# Patient Record
Sex: FEMALE | Race: AMERICAN INDIAN OR ALASKA NATIVE
[De-identification: names, ages, dates, MRNs, and addresses within clinical notes are randomized per-mention and may not be internally consistent; named-entity substitution may affect disease eponyms.]

---

## 2017-12-26 ENCOUNTER — HOSPITAL ENCOUNTER (EMERGENCY)
Dept: HOSPITAL 5 - ED | Age: 37
Discharge: HOME | End: 2017-12-26
Payer: COMMERCIAL

## 2017-12-26 VITALS — DIASTOLIC BLOOD PRESSURE: 56 MMHG | SYSTOLIC BLOOD PRESSURE: 97 MMHG

## 2017-12-26 DIAGNOSIS — N20.0: Primary | ICD-10-CM

## 2017-12-26 DIAGNOSIS — F17.200: ICD-10-CM

## 2017-12-26 DIAGNOSIS — N39.0: ICD-10-CM

## 2017-12-26 DIAGNOSIS — J45.909: ICD-10-CM

## 2017-12-26 LAB
BILIRUB UR QL STRIP: (no result)
BLOOD UR QL VISUAL: (no result)
KETONES UR STRIP-MCNC: (no result) MG/DL
LEUKOCYTE ESTERASE UR QL STRIP: (no result)
MUCOUS THREADS #/AREA URNS HPF: (no result) /HPF
NITRITE UR QL STRIP: (no result)
PH UR STRIP: 5 [PH] (ref 5–7)
PROT UR STRIP-MCNC: (no result) MG/DL
RBC #/AREA URNS HPF: 4 /HPF (ref 0–6)
UROBILINOGEN UR-MCNC: < 2 MG/DL (ref ?–2)
WBC #/AREA URNS HPF: 13 /HPF (ref 0–6)

## 2017-12-26 PROCEDURE — 81025 URINE PREGNANCY TEST: CPT

## 2017-12-26 PROCEDURE — 96374 THER/PROPH/DIAG INJ IV PUSH: CPT

## 2017-12-26 PROCEDURE — 81001 URINALYSIS AUTO W/SCOPE: CPT

## 2017-12-26 PROCEDURE — 99284 EMERGENCY DEPT VISIT MOD MDM: CPT

## 2017-12-26 PROCEDURE — 74176 CT ABD & PELVIS W/O CONTRAST: CPT

## 2017-12-26 PROCEDURE — 96361 HYDRATE IV INFUSION ADD-ON: CPT

## 2017-12-26 PROCEDURE — 72100 X-RAY EXAM L-S SPINE 2/3 VWS: CPT

## 2017-12-26 NOTE — EMERGENCY DEPARTMENT REPORT
ED Back Pain/Injury HPI





- General


Chief Complaint: Back Pain/Injury


Stated Complaint: BACK PAIN/BLURRY VISION


Time Seen by Provider: 12/26/17 18:08


Source: patient


Limitations: No Limitations





- History of Present Illness


Initial Comments: 





This is a 37-year-old female nontoxic, well nourished in appearance, no acute 

signs of distress presents to the ED with c/o of low/flank back pain x1 day. 

Patient denies any trauma to the region. Patient denies any numbness, tingling, 

radiation of pain, fever, chills, headache, nausea, vomiting, chest pain, 

shortness of breathe, stiff neck. Patient denies any bladder or bowel 

instability. Patient denies any dysuria, polyuria, hematuria. Patient denies 

any allergies or PMH besides asthma. Patient denies history of kidney stone or 

back pains.  Patient stated she wake up with these pains. 


MD Complaint: back pain


-: days(s)


Similar Symptoms Previously: No


Place: home


Radiation: none


Severity: mild


Severity scale (0 -10): 8


Quality: aching


Consistency: constant


Improves With: none


Worsens With: none


Associated Symptoms: denies other symptoms.  denies: confusion, weakness, chest 

pain, numbness, difficulty walking, cough, difficulty urinating, diaphoresis, 

incontinence, fever/chills, constipation, headaches, abdominal pain, loss of 

appetite, malaise, nausea/vomiting, rash, seizure, shortness of breath, syncope





- Related Data


 Previous Rx's











 Medication  Instructions  Recorded  Last Taken  Type


 


Acetaminophen/Codeine [Tylenol #3] 1 tab PO Q6H PRN #15 tab 11/01/15 Unknown Rx


 


Carbamide Peroxide 6.5% [Ear Wax 4 drops AU QHS #1 bottle 11/01/15 Unknown Rx





Drops]    


 


methOCARBAMOL [Robaxin TAB] 500 mg PO BID #20 tab 11/01/15 Unknown Rx


 


Sulfamethoxazole/Trimethoprim 1 each PO BID #14 tablet 12/26/17 Unknown Rx





[Bactrim DS TAB]    


 


traMADol [Ultram] 50 mg PO Q6HR PRN #12 tablet 12/26/17 Unknown Rx











 Allergies











Allergy/AdvReac Type Severity Reaction Status Date / Time


 


No Known Allergies Allergy   Verified 12/26/17 14:04














ED Review of Systems


ROS: 


Stated complaint: BACK PAIN/BLURRY VISION


Other details as noted in HPI





Constitutional: denies: chills, fever


Eyes: denies: eye pain, eye discharge, vision change


ENT: denies: ear pain, throat pain


Respiratory: denies: cough, shortness of breath, wheezing


Cardiovascular: denies: chest pain, palpitations


Endocrine: no symptoms reported


Gastrointestinal: denies: abdominal pain, nausea, diarrhea


Genitourinary: denies: urgency, dysuria, discharge


Musculoskeletal: back pain.  denies: joint swelling, arthralgia


Skin: denies: rash, lesions


Neurological: denies: headache, weakness, paresthesias


Psychiatric: denies: anxiety, depression


Hematological/Lymphatic: denies: easy bleeding, easy bruising





ED Past Medical Hx





- Past Medical History


Hx Asthma: Yes





- Surgical History


Hx Cholecystectomy: Yes


Additional Surgical History: c section





- Social History


Smoking Status: Current Every Day Smoker


Substance Use Type: None





- Medications


Home Medications: 


 Home Medications











 Medication  Instructions  Recorded  Confirmed  Last Taken  Type


 


Acetaminophen/Codeine [Tylenol #3] 1 tab PO Q6H PRN #15 tab 11/01/15  Unknown Rx


 


Carbamide Peroxide 6.5% [Ear Wax 4 drops AU QHS #1 bottle 11/01/15  Unknown Rx





Drops]     


 


methOCARBAMOL [Robaxin TAB] 500 mg PO BID #20 tab 11/01/15  Unknown Rx


 


Sulfamethoxazole/Trimethoprim 1 each PO BID #14 tablet 12/26/17  Unknown Rx





[Bactrim DS TAB]     


 


traMADol [Ultram] 50 mg PO Q6HR PRN #12 tablet 12/26/17  Unknown Rx














ED Physical Exam





- General


Limitations: No Limitations


General appearance: alert, in no apparent distress





- Head


Head exam: Present: atraumatic, normocephalic, normal inspection





- Eye


Eye exam: Present: normal appearance, PERRL, EOMI.  Absent: scleral icterus, 

conjunctival injection, nystagmus, periorbital swelling, periorbital tenderness


Pupils: Present: normal accommodation





- ENT


ENT exam: Present: normal exam, normal orophraynx, mucous membranes moist, TM's 

normal bilaterally, normal external ear exam





- Neck


Neck exam: Present: normal inspection, full ROM.  Absent: tenderness, 

meningismus, lymphadenopathy, thyromegaly





- Respiratory


Respiratory exam: Present: normal lung sounds bilaterally.  Absent: respiratory 

distress, wheezes, rales, rhonchi, stridor, chest wall tenderness, accessory 

muscle use, decreased breath sounds, prolonged expiratory





- Cardiovascular


Cardiovascular Exam: Present: regular rate, normal rhythm, normal heart sounds.

  Absent: irregular rhythm, systolic murmur, diastolic murmur, rubs, gallop





- GI/Abdominal


GI/Abdominal exam: Present: soft, normal bowel sounds.  Absent: distended, 

tenderness, guarding, rebound, rigid, diminished bowel sounds





- Rectal


Rectal exam: Present: deferred





- Extremities Exam


Extremities exam: Present: normal inspection, full ROM, normal capillary 

refill.  Absent: tenderness, pedal edema, joint swelling, calf tenderness





- Back Exam


Back exam: Present: normal inspection, full ROM, paraspinal tenderness (lumbar 

region).  Absent: tenderness, CVA tenderness (R), CVA tenderness (L), muscle 

spasm, vertebral tenderness, rash noted





- Expanded Back Exam


  ** Expanded


Back exam: Present: normal rectal tone (as per patient).  Absent: saddle 

anesthesia


Back exam: Negative Straight Leg Raising: Left, Right





- Neurological Exam


Neurological exam: Present: alert, oriented X3, CN II-XII intact, normal gait, 

reflexes normal





- Psychiatric


Psychiatric exam: Present: normal affect, normal mood





- Skin


Skin exam: Present: warm, dry, intact, normal color.  Absent: rash





- Other


Other exam information: 





No bladder or bowel instability.  No joint swelling or redness. No deformity.  

No numbness, no tingling.  No ecchymosis.  No abdominal distention.





ED Course


 Vital Signs











  12/26/17 12/26/17





  14:04 20:54


 


Temperature 99.3 F 98.1 F


 


Pulse Rate 92 H 68


 


Respiratory 18 18





Rate  


 


Blood Pressure 103/32 


 


Blood Pressure  97/56





[Left]  


 


O2 Sat by Pulse 100 99





Oximetry  














- Reevaluation(s)


Reevaluation #1: 





12/26/17 19:23


Patient is speaking in full sentences with no signs of distress noted.





ED Medical Decision Making





- Medical Decision Making





This is a 37-year-old female that presents with bilateral renal nonobstructing 

calculi and UTI. Patient is stable and was examined by me. UA obtained with 

elevated leukocytes, WBCs and RBCs.  CT without contrast of abdomen/pelvis has 

been obtained to rule out kidney stones and the radiologist.  X-ray of lumbar 

spine obtained and also dictated by radiologist and normal exam. Patient was 

notified of results with no questions noted. Patient received 1L of normal 

saline and Toradol in the ED which patient stated symptoms are improving and 

subsiding. Patient is discharged with Ultram and Bactrim for UTI. Patient was 

instructed to Follow-up with a primary care doctor in 3-5 days or if symptoms 

worsen and continue return to emergency room as soon as possible.  At time time 

of discharge, the patient does not seem toxic or ill in appearance.  No acute 

signs of distress noted.  Patient agrees to discharge treatment plan of care.  

No further questions noted by the patient.


Critical care attestation.: 


If time is entered above; I have spent that time in minutes in the direct care 

of this critically ill patient, excluding procedure time.








ED Disposition


Clinical Impression: 


 Renal calculi





UTI (urinary tract infection)


Qualifiers:


 Urinary tract infection type: site unspecified Hematuria presence: with 

hematuria Qualified Code(s): N39.0 - Urinary tract infection, site not specified





Disposition: DC-01 TO HOME OR SELFCARE


Is pt being admited?: No


Does the pt Need Aspirin: No


Condition: Stable


Instructions:  Sulfamethoxazole/Trimethoprim (By mouth), Tramadol (By mouth), 

Kidney Stones (ED)


Additional Instructions: 


Follow-up with a primary care doctor in 3-5 days or if symptoms worsen and 

continue return to emergency room as soon as possible. 


Increase hydration as much as possible.


Prescriptions: 


Sulfamethoxazole/Trimethoprim [Bactrim DS TAB] 1 each PO BID #14 tablet


traMADol [Ultram] 50 mg PO Q6HR PRN #12 tablet


 PRN Reason: Pain


Referrals: 


PRIMARY CARE,MD [Primary Care Provider] - 3-5 Days


TIMMY ESCOBAR MD [Staff Physician] - 3-5 Days


[facility] [Outside] - 3-5 Days


[facility] [Outside] - 3-5 Days


Forms:  Work/School Release Form(ED)

## 2017-12-26 NOTE — XRAY REPORT
FINAL REPORT



EXAM:  XR SPINE LUMBOSACRAL 2-3V



HISTORY:  back pain 



TECHNIQUE:  Three views lumbosacral spine



Comparison: CT same day with reconstructed images



FINDINGS:  

Normal lumbar lordosis.



Vertebral body heights and disc space heights are maintained.



There is no spondylolisthesis.



SI joints are.



Previous bilateral tubal interruption clips.



There are clips in the right upper quadrant.



CT earlier same day demonstrated bilateral renal non obstructive

calculi.



IMPRESSION:  

Normal lumbosacral spine series.

## 2017-12-26 NOTE — CAT SCAN REPORT
FINAL REPORT



PROCEDURE:  CT abdomen and pelvis without contrast. 



TECHNIQUE:  Computerized axial tomography of the abdomen and

pelvis was performed without intravenous contrast. This study is

performed without intravascular contrast material and its

sensitivity for abdominal and pelvic pathology, including

neoplasms, inflammation, abscess, free fluid, thrombosis,

arterial dissection and infarction, is reduced compared with a

contrast enhanced study. 



HISTORY:  Back pain, rule out kidney stone. 



COMPARISON:  No prior studies are available for comparison.



FINDINGS:  

The lung bases are clear. There are no pleural effusions. The

heart size is normal. The liver, pancreas and spleen are grossly

normal. Cholecystectomy clips are present. There is no biliary

dilatation. The adrenal glands are not enlarged. Both kidneys

appear normal in size and configuration. There are approximately

5 small nonobstructing right renal calculi. The largest is in the

middle 3rd measuring 3.3 millimeters. There are approximately 5

small nonobstructing calculi in the left kidney. The largest is

in the middle 3rd measuring 3.7 millimeters. There is no

hydronephrosis. There are no definite ureteral calculi. The

abdominal aorta has a normal caliber. There is no retroperitoneal

adenopathy. The unopacified gastrointestinal tract is

unremarkable. A normal appendix is visible. The bladder, uterus

and adnexal regions are unremarkable. There are bilateral

fallopian tube clips. The regional skeleton appears intact. 



IMPRESSION:  

Small bilateral nonobstructing renal calculi. Previous

cholecystectomy and previous fallopian tube ligation.

## 2018-03-08 ENCOUNTER — HOSPITAL ENCOUNTER (EMERGENCY)
Dept: HOSPITAL 5 - ED | Age: 38
Discharge: HOME | End: 2018-03-08
Payer: COMMERCIAL

## 2018-03-08 VITALS — SYSTOLIC BLOOD PRESSURE: 117 MMHG | DIASTOLIC BLOOD PRESSURE: 74 MMHG
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
## 2018-03-08 DIAGNOSIS — Z90.49: ICD-10-CM

## 2018-03-08 DIAGNOSIS — F17.200: ICD-10-CM
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
## 2018-03-08 DIAGNOSIS — M54.5: Primary | ICD-10-CM
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
## 2018-03-08 DIAGNOSIS — J45.909: ICD-10-CM

## 2018-03-08 PROCEDURE — 72100 X-RAY EXAM L-S SPINE 2/3 VWS: CPT

## 2018-03-08 PROCEDURE — 99283 EMERGENCY DEPT VISIT LOW MDM: CPT
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
## 2018-03-08 NOTE — XRAY REPORT
LUMBOSACRAL SPINE, 3 VIEWS:



History: Back pain



Findings: The vertebral bodies, disk spaces and posterior elements are 

intact.  No compression deformity or malalignment.  The SI joints are 

symmetric and unremarkable.



Impression:

1.  No evidence for acute injury to the lumbar spine.
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
## 2018-04-13 ENCOUNTER — HOSPITAL ENCOUNTER (EMERGENCY)
Dept: HOSPITAL 5 - ED | Age: 38
Discharge: HOME | End: 2018-04-13
Payer: COMMERCIAL

## 2018-04-13 VITALS — DIASTOLIC BLOOD PRESSURE: 76 MMHG | SYSTOLIC BLOOD PRESSURE: 133 MMHG

## 2018-04-13 DIAGNOSIS — Z90.49: ICD-10-CM

## 2018-04-13 DIAGNOSIS — F17.200: ICD-10-CM

## 2018-04-13 DIAGNOSIS — L03.011: Primary | ICD-10-CM

## 2018-04-13 DIAGNOSIS — J45.909: ICD-10-CM

## 2018-04-13 NOTE — EMERGENCY DEPARTMENT REPORT
- General


Chief complaint: Extremity Injury, Upper


Stated complaint: FINGER PAIN


Time Seen by Provider: 04/13/18 09:58


Source: patient


Mode of arrival: Ambulatory


Limitations: No Limitations





- History of Present Illness


Initial comments: 





37-year-old American female comes in reporting pain in posterior right fifth 

digit.  She reports this been going on for 3 days.  She denies any past medical 

history currently takes no medications on a daily basis and has no known drug 

allergies.  Patient denies any recent trauma no fever no chills no nausea no 

vomiting.  Patient reports she is able to move all her extremities without 

difficulties.  She feels that this happened because she cut her finger nail and 

nicked the side of her finger.


MD complaint: abscess/boil


Severity scale (0 -10): 9


Quality: aching


Consistency: constant


Improves with: none


Worsens with: none


Associated symptoms: denies other symptoms


Treatments Prior to Arrival: none





- Related Data


 Previous Rx's











 Medication  Instructions  Recorded  Last Taken  Type


 


Acetaminophen/Codeine [Tylenol #3] 1 tab PO Q6H PRN #15 tab 11/01/15 Unknown Rx


 


Carbamide Peroxide 6.5% [Ear Wax 4 drops AU QHS #1 bottle 11/01/15 Unknown Rx





Drops]    


 


methOCARBAMOL [Robaxin TAB] 500 mg PO BID #20 tab 11/01/15 Unknown Rx


 


traMADol [Ultram] 50 mg PO Q6HR PRN #12 tablet 12/26/17 Unknown Rx


 


Cyclobenzaprine [Flexeril] 10 mg PO TID PRN #12 tablet 03/08/18 Unknown Rx


 


Ibuprofen [Motrin 800 MG tab] 800 mg PO Q8HR PRN #30 tablet 04/13/18 Unknown Rx


 


Sulfamethoxazole/Trimethoprim 1 each PO BID #14 tablet 04/13/18 Unknown Rx





[Bactrim DS TAB]    











 Allergies











Allergy/AdvReac Type Severity Reaction Status Date / Time


 


No Known Allergies Allergy   Verified 12/26/17 14:04














Abscess Boil HPI





- HPI


Chief Complaint: Extremity Injury, Upper


Stated Complaint: FINGER PAIN


Time Seen by Provider: 04/13/18 09:58


Home Medications: 


 Previous Rx's











 Medication  Instructions  Recorded  Last Taken  Type


 


Acetaminophen/Codeine [Tylenol #3] 1 tab PO Q6H PRN #15 tab 11/01/15 Unknown Rx


 


Carbamide Peroxide 6.5% [Ear Wax 4 drops AU QHS #1 bottle 11/01/15 Unknown Rx





Drops]    


 


methOCARBAMOL [Robaxin TAB] 500 mg PO BID #20 tab 11/01/15 Unknown Rx


 


traMADol [Ultram] 50 mg PO Q6HR PRN #12 tablet 12/26/17 Unknown Rx


 


Cyclobenzaprine [Flexeril] 10 mg PO TID PRN #12 tablet 03/08/18 Unknown Rx


 


Ibuprofen [Motrin 800 MG tab] 800 mg PO Q8HR PRN #30 tablet 04/13/18 Unknown Rx


 


Sulfamethoxazole/Trimethoprim 1 each PO BID #14 tablet 04/13/18 Unknown Rx





[Bactrim DS TAB]    











Allergies/Adverse Reactions: 


 Allergies











Allergy/AdvReac Type Severity Reaction Status Date / Time


 


No Known Allergies Allergy   Verified 12/26/17 14:04














ED Review of Systems


ROS: 


Stated complaint: FINGER PAIN


Other details as noted in HPI





Constitutional: denies: chills, fever


Eyes: denies: eye pain, eye discharge, vision change


ENT: denies: ear pain, throat pain


Respiratory: denies: cough, shortness of breath, wheezing


Cardiovascular: denies: chest pain, palpitations


Endocrine: no symptoms reported


Gastrointestinal: denies: abdominal pain, nausea, diarrhea


Genitourinary: denies: urgency, dysuria, discharge


Musculoskeletal: denies: back pain, joint swelling, arthralgia


Skin: other (swelling to right fifth digit with redness and pus under the skin)

.  denies: rash, lesions


Neurological: denies: headache, weakness, paresthesias


Psychiatric: denies: anxiety, depression


Hematological/Lymphatic: denies: easy bleeding, easy bruising





ED Past Medical Hx





- Past Medical History


Hx Asthma: Yes





- Surgical History


Hx Cholecystectomy: Yes


Additional Surgical History: c section





- Social History


Smoking Status: Current Every Day Smoker


Substance Use Type: None





- Medications


Home Medications: 


 Home Medications











 Medication  Instructions  Recorded  Confirmed  Last Taken  Type


 


Acetaminophen/Codeine [Tylenol #3] 1 tab PO Q6H PRN #15 tab 11/01/15  Unknown Rx


 


Carbamide Peroxide 6.5% [Ear Wax 4 drops AU QHS #1 bottle 11/01/15  Unknown Rx





Drops]     


 


methOCARBAMOL [Robaxin TAB] 500 mg PO BID #20 tab 11/01/15  Unknown Rx


 


traMADol [Ultram] 50 mg PO Q6HR PRN #12 tablet 12/26/17  Unknown Rx


 


Cyclobenzaprine [Flexeril] 10 mg PO TID PRN #12 tablet 03/08/18  Unknown Rx


 


Ibuprofen [Motrin 800 MG tab] 800 mg PO Q8HR PRN #30 tablet 04/13/18  Unknown Rx


 


Sulfamethoxazole/Trimethoprim 1 each PO BID #14 tablet 04/13/18  Unknown Rx





[Bactrim DS TAB]     














ED Physical Exam





- General


Limitations: No Limitations


General appearance: alert, in no apparent distress





- Head


Head exam: Present: atraumatic, normocephalic





- Eye


Eye exam: Present: normal appearance





- ENT


ENT exam: Present: mucous membranes moist





- Neck


Neck exam: Present: normal inspection





- Respiratory


Respiratory exam: Present: normal lung sounds bilaterally.  Absent: respiratory 

distress





- Cardiovascular


Cardiovascular Exam: Present: regular rate, normal rhythm.  Absent: systolic 

murmur, diastolic murmur, rubs, gallop





- GI/Abdominal


GI/Abdominal exam: Present: soft, normal bowel sounds





- Extremities Exam


Extremities exam: Present: normal inspection





- Back Exam


Back exam: Present: normal inspection





- Neurological Exam


Neurological exam: Present: alert, oriented X3





- Psychiatric


Psychiatric exam: Present: normal affect, normal mood





- Skin


Skin exam: Present: warm, dry, intact, normal color, other (right hand fifth 

digit swelling at the tip of the finger with purulent discharge beneath the 

skin with tenderness to touch, full range of motion).  Absent: rash





ED Course





 Vital Signs











  04/13/18





  08:03


 


Temperature 97.8 F


 


Pulse Rate 80


 


Respiratory 18





Rate 


 


Blood Pressure 133/76


 


O2 Sat by Pulse 100





Oximetry 














- I & D


  ** Right Finger


Type of Procedure: Simple


Site: right fifth digit lateral cuticle


Blade Size: 11


I & D Procedure: betadine prep, sterile drapes applied, sterile dressing applied


Critical care attestation.: 


If time is entered above; I have spent that time in minutes in the direct care 

of this critically ill patient, excluding procedure time.








ED Disposition


Clinical Impression: 


 Paronychia of finger of right hand





Disposition: DC-01 TO HOME OR SELFCARE


Is pt being admited?: No


Does the pt Need Aspirin: No


Condition: Stable


Instructions:  Paronychia (ED)


Additional Instructions: 


Complete antibiotics as prescribed.  Pain medication over-the-counter Tylenol 

or Motrin.  Return back to the emergency room if symptoms persist or gets worse


Prescriptions: 


Ibuprofen [Motrin 800 MG tab] 800 mg PO Q8HR PRN #30 tablet


 PRN Reason: Pain


Sulfamethoxazole/Trimethoprim [Bactrim DS TAB] 1 each PO BID #14 tablet


Referrals: 


PRIMARY CARE,MD [Primary Care Provider] - 3-5 Days


Forms:  Work/School Release Form(ED)

## 2019-07-05 ENCOUNTER — HOSPITAL ENCOUNTER (EMERGENCY)
Dept: HOSPITAL 5 - ED | Age: 39
LOS: 1 days | Discharge: HOME | End: 2019-07-06
Payer: MEDICAID

## 2019-07-05 VITALS — DIASTOLIC BLOOD PRESSURE: 74 MMHG | SYSTOLIC BLOOD PRESSURE: 147 MMHG

## 2019-07-05 DIAGNOSIS — J45.909: ICD-10-CM

## 2019-07-05 DIAGNOSIS — M79.605: ICD-10-CM

## 2019-07-05 DIAGNOSIS — Z90.49: ICD-10-CM

## 2019-07-05 DIAGNOSIS — N30.00: Primary | ICD-10-CM

## 2019-07-05 DIAGNOSIS — Z79.899: ICD-10-CM

## 2019-07-05 PROCEDURE — 87086 URINE CULTURE/COLONY COUNT: CPT

## 2019-07-05 PROCEDURE — 81001 URINALYSIS AUTO W/SCOPE: CPT

## 2019-07-05 PROCEDURE — 85025 COMPLETE CBC W/AUTO DIFF WBC: CPT

## 2019-07-05 PROCEDURE — 96372 THER/PROPH/DIAG INJ SC/IM: CPT

## 2019-07-05 PROCEDURE — 85610 PROTHROMBIN TIME: CPT

## 2019-07-05 PROCEDURE — 99283 EMERGENCY DEPT VISIT LOW MDM: CPT

## 2019-07-05 PROCEDURE — 36415 COLL VENOUS BLD VENIPUNCTURE: CPT

## 2019-07-05 PROCEDURE — 81025 URINE PREGNANCY TEST: CPT

## 2019-07-05 PROCEDURE — 85379 FIBRIN DEGRADATION QUANT: CPT

## 2019-07-06 LAB
BASOPHILS # (AUTO): 0 K/MM3 (ref 0–0.1)
BASOPHILS NFR BLD AUTO: 0.3 % (ref 0–1.8)
BILIRUB UR QL STRIP: (no result)
BLOOD UR QL VISUAL: (no result)
EOSINOPHIL # BLD AUTO: 0.2 K/MM3 (ref 0–0.4)
EOSINOPHIL NFR BLD AUTO: 1.6 % (ref 0–4.3)
HCT VFR BLD CALC: 30.6 % (ref 30.3–42.9)
HGB BLD-MCNC: 9.7 GM/DL (ref 10.1–14.3)
INR PPP: 0.99 (ref 0.87–1.13)
LYMPHOCYTES # BLD AUTO: 3 K/MM3 (ref 1.2–5.4)
LYMPHOCYTES NFR BLD AUTO: 28.5 % (ref 13.4–35)
MCHC RBC AUTO-ENTMCNC: 32 % (ref 30–34)
MCV RBC AUTO: 71 FL (ref 79–97)
MONOCYTES # (AUTO): 0.8 K/MM3 (ref 0–0.8)
MONOCYTES % (AUTO): 7.6 % (ref 0–7.3)
MUCOUS THREADS #/AREA URNS HPF: (no result) /HPF
PH UR STRIP: 6 [PH] (ref 5–7)
PLATELET # BLD: 438 K/MM3 (ref 140–440)
RBC # BLD AUTO: 4.32 M/MM3 (ref 3.65–5.03)
RBC #/AREA URNS HPF: > 182 /HPF (ref 0–6)
UROBILINOGEN UR-MCNC: < 2 MG/DL (ref ?–2)
WBC #/AREA URNS HPF: 87 /HPF (ref 0–6)

## 2019-07-06 NOTE — EMERGENCY DEPARTMENT REPORT
ED General Adult HPI





- General


Chief complaint: Abdominal Pain


Stated complaint: ABDOMINAL PAIN/CRAMPING


Time Seen by Provider: 07/06/19 01:03


Source: patient


Mode of arrival: Ambulatory


Limitations: No Limitations





- History of Present Illness


Initial comments: 





Patient is a 39-year-old  female who is presenting with 

suprapubic and lower back crampiness.  This been present for the last 2 days.  

Patient denies any nausea vomiting diarrhea or vaginal bleeding but she does 

have some mild dysuria.  The patient states pain is 6 out of 10 in severity.  

Patient also states that her left lower extremity is hurting over the last 

several days as well.  Has been no swelling or redness present.  Patient has a 

history of using a walker secondary to an injury she sustained to the right 

ankle.  Patient states for the last several days her left lower extremity C in 

the posterior calf has been aching and throbbing.  There's been no trauma that 

she can remember.


Severity scale (0 -10): 7





- Related Data


                                  Previous Rx's











 Medication  Instructions  Recorded  Last Taken  Type


 


Acetaminophen/Codeine [Tylenol #3] 1 tab PO Q6H PRN #15 tab 11/01/15 Unknown Rx


 


Carbamide Peroxide 6.5% [Ear Wax 4 drops AU QHS #1 bottle 11/01/15 Unknown Rx





Drops]    


 


methOCARBAMOL [Robaxin TAB] 500 mg PO BID #20 tab 11/01/15 Unknown Rx


 


traMADol [Ultram] 50 mg PO Q6HR PRN #12 tablet 12/26/17 Unknown Rx


 


Cyclobenzaprine [Flexeril] 10 mg PO TID PRN #12 tablet 03/08/18 Unknown Rx


 


Ibuprofen [Motrin 800 MG tab] 800 mg PO Q8HR PRN #30 tablet 04/13/18 Unknown Rx


 


Sulfamethoxazole/Trimethoprim 1 each PO BID #14 tablet 04/13/18 Unknown Rx





[Bactrim DS TAB]    


 


Ibuprofen [Motrin 600 MG tab] 600 mg PO Q8H PRN #20 tablet 07/06/19 Unknown Rx


 


Nitrofurantoin Mono/M-Cryst 100 mg PO Q12HR #14 capsule 07/06/19 Unknown Rx





[Macrobid CAP]    


 


Phenazopyridine [Pyridium] 200 mg PO BID #6 tab 07/06/19 Unknown Rx


 


traMADol [Ultram] 50 mg PO Q6HR PRN #12 tablet 07/06/19 Unknown Rx











                                    Allergies











Allergy/AdvReac Type Severity Reaction Status Date / Time


 


No Known Allergies Allergy   Verified 12/26/17 14:04














ED Review of Systems


ROS: 


Stated complaint: ABDOMINAL PAIN/CRAMPING


Other details as noted in HPI





Comment: All other systems reviewed and negative





ED Past Medical Hx





- Past Medical History


Hx Asthma: Yes





- Surgical History


Hx Cholecystectomy: Yes


Additional Surgical History: c section





- Social History


Smoking Status: Unknown if ever smoked


Substance Use Type: None





- Medications


Home Medications: 


                                Home Medications











 Medication  Instructions  Recorded  Confirmed  Last Taken  Type


 


Acetaminophen/Codeine [Tylenol #3] 1 tab PO Q6H PRN #15 tab 11/01/15  Unknown Rx


 


Carbamide Peroxide 6.5% [Ear Wax 4 drops AU QHS #1 bottle 11/01/15  Unknown Rx





Drops]     


 


methOCARBAMOL [Robaxin TAB] 500 mg PO BID #20 tab 11/01/15  Unknown Rx


 


traMADol [Ultram] 50 mg PO Q6HR PRN #12 tablet 12/26/17  Unknown Rx


 


Cyclobenzaprine [Flexeril] 10 mg PO TID PRN #12 tablet 03/08/18  Unknown Rx


 


Ibuprofen [Motrin 800 MG tab] 800 mg PO Q8HR PRN #30 tablet 04/13/18  Unknown Rx


 


Sulfamethoxazole/Trimethoprim 1 each PO BID #14 tablet 04/13/18  Unknown Rx





[Bactrim DS TAB]     


 


Ibuprofen [Motrin 600 MG tab] 600 mg PO Q8H PRN #20 tablet 07/06/19  Unknown Rx


 


Nitrofurantoin Mono/M-Cryst 100 mg PO Q12HR #14 capsule 07/06/19  Unknown Rx





[Macrobid CAP]     


 


Phenazopyridine [Pyridium] 200 mg PO BID #6 tab 07/06/19  Unknown Rx


 


traMADol [Ultram] 50 mg PO Q6HR PRN #12 tablet 07/06/19  Unknown Rx














ED Physical Exam





- General


Limitations: No Limitations


General appearance: alert, in no apparent distress





- Head


Head exam: Present: atraumatic, normocephalic





- Eye


Eye exam: Present: normal appearance





- ENT


ENT exam: Present: mucous membranes moist





- Neck


Neck exam: Present: normal inspection





- Respiratory


Respiratory exam: Present: normal lung sounds bilaterally.  Absent: respiratory 

distress, wheezes, rales, rhonchi





- Cardiovascular


Cardiovascular Exam: Present: regular rate, normal rhythm.  Absent: systolic 

murmur, diastolic murmur, rubs, gallop





- GI/Abdominal


GI/Abdominal exam: Present: soft, tenderness (suprapubic), normal bowel sounds. 

 Absent: distended, guarding, rebound, rigid





- Extremities Exam


Extremities exam: Present: normal inspection





- Back Exam


Back exam: Present: normal inspection





- Neurological Exam


Neurological exam: Present: alert, oriented X3





- Psychiatric


Psychiatric exam: Present: normal affect, normal mood





- Skin


Skin exam: Present: warm, dry, intact, normal color.  Absent: rash





ED Course





                                   Vital Signs











  07/05/19 07/05/19 07/06/19





  23:05 23:10 01:17


 


Temperature 98.8 F 98.4 F 


 


Pulse Rate 85 68 


 


Respiratory 16 16 16





Rate   


 


Blood Pressure 147/74  


 


O2 Sat by Pulse 99 100 





Oximetry   














ED Medical Decision Making





- Lab Data


Result diagrams: 


                                 07/05/19 23:25











                                   Lab Results











  07/05/19 07/05/19 07/05/19 Range/Units





  23:25 23:25 23:30 


 


WBC  10.4    (4.5-11.0)  K/mm3


 


RBC  4.32    (3.65-5.03)  M/mm3


 


Hgb  9.7 L    (10.1-14.3)  gm/dl


 


Hct  30.6    (30.3-42.9)  %


 


MCV  71 L    (79-97)  fl


 


MCH  23 L    (28-32)  pg


 


MCHC  32    (30-34)  %


 


RDW  18.2 H    (13.2-15.2)  %


 


Plt Count  438    (140-440)  K/mm3


 


Lymph % (Auto)  28.5    (13.4-35.0)  %


 


Mono % (Auto)  7.6 H    (0.0-7.3)  %


 


Eos % (Auto)  1.6    (0.0-4.3)  %


 


Baso % (Auto)  0.3    (0.0-1.8)  %


 


Lymph #  3.0    (1.2-5.4)  K/mm3


 


Mono #  0.8    (0.0-0.8)  K/mm3


 


Eos #  0.2    (0.0-0.4)  K/mm3


 


Baso #  0.0    (0.0-0.1)  K/mm3


 


Seg Neutrophils %  62.0    (40.0-70.0)  %


 


Seg Neutrophils #  6.4    (1.8-7.7)  K/mm3


 


PT   12.8   (12.2-14.9)  Sec.


 


INR   0.99   (0.87-1.13)  


 


D-Dimer     (0-234)  ng/mlDDU


 


Urine Color    Red  (Yellow)  


 


Urine Turbidity    Slightly-cloudy  (Clear)  


 


Urine pH    6.0  (5.0-7.0)  


 


Ur Specific Gravity    1.020  (1.003-1.030)  


 


Urine Protein    30 mg/dl  (Negative)  mg/dL


 


Urine Glucose (UA)    Neg  (Negative)  mg/dL


 


Urine Ketones    Neg  (Negative)  mg/dL


 


Urine Blood    Lg  (Negative)  


 


Urine Nitrite    Neg  (Negative)  


 


Urine Bilirubin    Neg  (Negative)  


 


Urine Urobilinogen    < 2.0  (<2.0)  mg/dL


 


Ur Leukocyte Esterase    Sm  (Negative)  


 


Urine WBC (Auto)    87.0 H  (0.0-6.0)  /HPF


 


Urine RBC (Auto)    > 182.0  (0.0-6.0)  /HPF


 


U Epithel Cells (Auto)    4.0  (0-13.0)  /HPF


 


Urine Mucus    1+  /HPF


 


Urine Yeast (Budding)    2+  /HPF


 


Urine HCG, Qual    Negative  (Negative)  














  07/06/19 Range/Units





  01:26 


 


WBC   (4.5-11.0)  K/mm3


 


RBC   (3.65-5.03)  M/mm3


 


Hgb   (10.1-14.3)  gm/dl


 


Hct   (30.3-42.9)  %


 


MCV   (79-97)  fl


 


MCH   (28-32)  pg


 


MCHC   (30-34)  %


 


RDW   (13.2-15.2)  %


 


Plt Count   (140-440)  K/mm3


 


Lymph % (Auto)   (13.4-35.0)  %


 


Mono % (Auto)   (0.0-7.3)  %


 


Eos % (Auto)   (0.0-4.3)  %


 


Baso % (Auto)   (0.0-1.8)  %


 


Lymph #   (1.2-5.4)  K/mm3


 


Mono #   (0.0-0.8)  K/mm3


 


Eos #   (0.0-0.4)  K/mm3


 


Baso #   (0.0-0.1)  K/mm3


 


Seg Neutrophils %   (40.0-70.0)  %


 


Seg Neutrophils #   (1.8-7.7)  K/mm3


 


PT   (12.2-14.9)  Sec.


 


INR   (0.87-1.13)  


 


D-Dimer  373.86 H  (0-234)  ng/mlDDU


 


Urine Color   (Yellow)  


 


Urine Turbidity   (Clear)  


 


Urine pH   (5.0-7.0)  


 


Ur Specific Gravity   (1.003-1.030)  


 


Urine Protein   (Negative)  mg/dL


 


Urine Glucose (UA)   (Negative)  mg/dL


 


Urine Ketones   (Negative)  mg/dL


 


Urine Blood   (Negative)  


 


Urine Nitrite   (Negative)  


 


Urine Bilirubin   (Negative)  


 


Urine Urobilinogen   (<2.0)  mg/dL


 


Ur Leukocyte Esterase   (Negative)  


 


Urine WBC (Auto)   (0.0-6.0)  /HPF


 


Urine RBC (Auto)   (0.0-6.0)  /HPF


 


U Epithel Cells (Auto)   (0-13.0)  /HPF


 


Urine Mucus   /HPF


 


Urine Yeast (Budding)   /HPF


 


Urine HCG, Qual   (Negative)  














- Medical Decision Making





Patient does show evidence of urinary tract infection.  Patient be started on 

antibiotics.  We are unable to do an ultrasound of the patient's lower extremity

 at this time.  Her d-dimer was slightly elevated.  Patient given a orders for 

outpatient ultrasound and she can return to the outpatient radiology.


Critical care attestation.: 


If time is entered above; I have spent that time in minutes in the direct care 

of this critically ill patient, excluding procedure time.








ED Disposition


Clinical Impression: 


Acute cystitis


Qualifiers:


 Hematuria presence: without hematuria Qualified Code(s): N30.00 - Acute 

cystitis without hematuria





Leg pain


Qualifiers:


 Laterality: left Qualified Code(s): M79.605 - Pain in left leg





Disposition: DC-01 TO HOME OR SELFCARE


Is pt being admited?: No


Does the pt Need Aspirin: No


Condition: Stable


Instructions:  Urinary Tract Infection in Women (ED)


Referrals: 


[facility], MD [Primary Care Provider] - 3-5 Days


Time of Disposition: 02:26

## 2019-10-06 ENCOUNTER — HOSPITAL ENCOUNTER (EMERGENCY)
Dept: HOSPITAL 5 - ED | Age: 39
Discharge: HOME | End: 2019-10-06
Payer: MEDICAID

## 2019-10-06 VITALS — SYSTOLIC BLOOD PRESSURE: 117 MMHG | DIASTOLIC BLOOD PRESSURE: 89 MMHG

## 2019-10-06 DIAGNOSIS — Z79.899: ICD-10-CM

## 2019-10-06 DIAGNOSIS — Z90.49: ICD-10-CM

## 2019-10-06 DIAGNOSIS — F17.200: ICD-10-CM

## 2019-10-06 DIAGNOSIS — M25.571: Primary | ICD-10-CM

## 2019-10-06 DIAGNOSIS — I10: ICD-10-CM

## 2019-10-06 DIAGNOSIS — J45.909: ICD-10-CM

## 2019-10-06 PROCEDURE — 96372 THER/PROPH/DIAG INJ SC/IM: CPT

## 2019-10-06 PROCEDURE — 73610 X-RAY EXAM OF ANKLE: CPT

## 2019-10-06 PROCEDURE — 99283 EMERGENCY DEPT VISIT LOW MDM: CPT

## 2019-10-06 NOTE — XRAY REPORT
RIGHT ANKLE 3 VIEWS



INDICATION / CLINICAL INFORMATION:

ankle pain.



COMPARISON:

None available.



FINDINGS:

Distal fibular plate, screws and 2 syndesmosis screws traverse the super syndesmotic distal fibular f
racture in expected position which is healed radiographically with osseous continuity. There is a pro
bable small OCD lesion within the medial talar dome with flecks of calcium seen within the region of 
the deltoid ligament likely from remote ligamentous injury. No acute fracture or dislocation is seen 
within the right ankle.



 



Signer Name: Kristian Schulz MD 

Signed: 10/6/2019 5:27 PM

 Workstation Name: [id number]

## 2019-10-06 NOTE — EMERGENCY DEPARTMENT REPORT
ED Lower Extremity HPI





- General


Chief Complaint: Extremity Injury, Lower


Stated Complaint: RT ANKLE PAIN


Time Seen by Provider: 10/06/19 16:46


Source: EMS


Mode of arrival: Wheelchair


Limitations: No Limitations





- History of Present Illness


Initial Comments: 





39-year-old female with a past medical history right ankle surgery July 2018 

presents to the hospital complaining of right ankle pain that started yesterday.

 No recent trauma reported.  Patient complains of ankle swelling and pain 

radiating up to right lower leg.  Pain is moderate to severe, constant, worse 

with palpation and movement.  No alleviating factors.  Patient took Tylenol 

without relief.  Patient having trouble ambulating due to pain. No recent travel

or bcp use.





- Related Data


                                  Previous Rx's











 Medication  Instructions  Recorded  Last Taken  Type


 


Acetaminophen/Codeine [Tylenol #3] 1 tab PO Q6H PRN #15 tab 11/01/15 Unknown Rx


 


Carbamide Peroxide 6.5% [Ear Wax 4 drops AU QHS #1 bottle 11/01/15 Unknown Rx





Drops]    


 


methOCARBAMOL [Robaxin TAB] 500 mg PO BID #20 tab 11/01/15 Unknown Rx


 


Cyclobenzaprine [Flexeril] 10 mg PO TID PRN #12 tablet 03/08/18 Unknown Rx


 


Sulfamethoxazole/Trimethoprim 1 each PO BID #14 tablet 04/13/18 Unknown Rx





[Bactrim DS TAB]    


 


Ibuprofen [Motrin 600 MG tab] 600 mg PO Q8H PRN #20 tablet 07/06/19 Unknown Rx


 


Nitrofurantoin Mono/M-Cryst 100 mg PO Q12HR #14 capsule 07/06/19 Unknown Rx





[Macrobid CAP]    


 


Phenazopyridine [Pyridium] 200 mg PO BID #6 tab 07/06/19 Unknown Rx


 


traMADol [Ultram] 50 mg PO Q6HR PRN #12 tablet 07/06/19 Unknown Rx


 


Ibuprofen [Motrin 800 MG tab] 800 mg PO Q8HR PRN #30 tablet 10/06/19 Unknown Rx


 


traMADol [Ultram 50 MG tab] 50 mg PO Q6HR PRN #20 tablet 10/06/19 Unknown Rx











                                    Allergies











Allergy/AdvReac Type Severity Reaction Status Date / Time


 


No Known Allergies Allergy   Verified 12/26/17 14:04














ED Review of Systems


ROS: 


Stated complaint: RT ANKLE PAIN


Other details as noted in HPI





Comment: All other systems reviewed and negative





ED Past Medical Hx





- Past Medical History


Hx Hypertension: Yes


Hx Asthma: Yes





- Surgical History


Hx Cholecystectomy: Yes


Additional Surgical History: c section.  rt ankle surgery





- Social History


Smoking Status: Current Some Day Smoker


Substance Use Type: None





- Medications


Home Medications: 


                                Home Medications











 Medication  Instructions  Recorded  Confirmed  Last Taken  Type


 


Acetaminophen/Codeine [Tylenol #3] 1 tab PO Q6H PRN #15 tab 11/01/15  Unknown Rx


 


Carbamide Peroxide 6.5% [Ear Wax 4 drops AU QHS #1 bottle 11/01/15  Unknown Rx





Drops]     


 


methOCARBAMOL [Robaxin TAB] 500 mg PO BID #20 tab 11/01/15  Unknown Rx


 


Cyclobenzaprine [Flexeril] 10 mg PO TID PRN #12 tablet 03/08/18  Unknown Rx


 


Sulfamethoxazole/Trimethoprim 1 each PO BID #14 tablet 04/13/18  Unknown Rx





[Bactrim DS TAB]     


 


Ibuprofen [Motrin 600 MG tab] 600 mg PO Q8H PRN #20 tablet 07/06/19  Unknown Rx


 


Nitrofurantoin Mono/M-Cryst 100 mg PO Q12HR #14 capsule 07/06/19  Unknown Rx





[Macrobid CAP]     


 


Phenazopyridine [Pyridium] 200 mg PO BID #6 tab 07/06/19  Unknown Rx


 


traMADol [Ultram] 50 mg PO Q6HR PRN #12 tablet 07/06/19  Unknown Rx


 


Ibuprofen [Motrin 800 MG tab] 800 mg PO Q8HR PRN #30 tablet 10/06/19  Unknown Rx


 


traMADol [Ultram 50 MG tab] 50 mg PO Q6HR PRN #20 tablet 10/06/19  Unknown Rx














ED Physical Exam





- General


Limitations: No Limitations





- Other


Other exam information: 





Gen.: No acute distress


Head: Atraumatic


Eyes: Normal appearance


ENT: Moist mucous membranes


Neck: Normal appearance, no posterior midline tenderness, no meningismus


Chest: Clear to auscultation bilaterally


Cardiovascular: Regular rate and rhythm


Abdomen: Normal appearance, soft, nontender, no rebound or guarding, normal 

bowel sounds


Back: Normal appearance, nontender


Extremity: Right ankle surgical changes noted.  Minimal ankle swelling.  

Tenderness at ankle bilaterally extending upward to cath.  Delayed asymmetry 

noted.  2+ DP pulses.  Patient able to flex and extend although with pain.


Neuro: Alert, clear speech, no focal motor or sensory deficit


Psychiatric: Appropriate


Skin: No rash





ED Course


                                   Vital Signs











  10/06/19





  16:20


 


Temperature 97.9 F


 


Pulse Rate 81


 


Respiratory 18





Rate 


 


Blood Pressure 117/89


 


O2 Sat by Pulse 99





Oximetry 














ED Lower Extremity MDM





- Radiology Data


Radiology results: report reviewed





IGHT ANKLE 3 VIEWS





INDICATION / CLINICAL INFORMATION:


ankle pain.





COMPARISON:


None available.





FINDINGS:


Distal fibular plate, screws and 2 syndesmosis screws traverse the super 

syndesmotic distal fibular


fracture in expected position which is healed radiographically with osseous 

continuity. There is a


probable small OCD lesion within the medial talar dome with flecks of calcium 

seen within the region


of the deltoid ligament likely from remote ligamentous injury. No acute fracture

or dislocation is


seen within the right ankle.








- Medical Decision Making





Patient has right ankle swelling and pain extending up to calf.  No recent 

injury reported.  X-ray review.  Patient placed in ankle stirrup, provided 

crutches, and pain medication.  Outpatient Doppler ordered for a.m.  Outpatient 

orthopedics follow-up recommended.





- Differential Diagnosis


dvt, arthritis, fracture, sprain


Critical Care Time: No


Critical care attestation.: 


If time is entered above; I have spent that time in minutes in the direct care 

of this critically ill patient, excluding procedure time.








ED Disposition


Clinical Impression: 


 Right ankle pain, History of fracture of right ankle





Disposition: DC-01 TO HOME OR SELFCARE


Is pt being admited?: No


Does the pt Need Aspirin: No


Condition: Stable


Instructions:  Ankle Sprain (ED)


Additional Instructions: 


Take the medication as prescribed.  Follow-up with your doctor or with the 

doctor/clinic provided.  Return if symptoms worsen as indicated by your 

discharge instructions. An outpatient ultrasound of the right leg has been 

ordered.  Please return as instructed.


Prescriptions: 


Ibuprofen [Motrin 800 MG tab] 800 mg PO Q8HR PRN #30 tablet


 PRN Reason: Pain


traMADol [Ultram 50 MG tab] 50 mg PO Q6HR PRN #20 tablet


 PRN Reason: Pain


Referrals: 


[facility]ARE,MD [Primary Care Provider] - 3-5 Days


YESSY FERRELL MD [Staff Physician] - 3-5 Days


(orthopedic doctor


)


Time of Disposition: 18:47

## 2019-10-06 NOTE — EMERGENCY DEPARTMENT REPORT
Blank Doc





- Documentation


Documentation: 





39-year-old female that presents with right ankle pain.  Unsure of any injuries.





This initial assessment/diagnostic orders/clinical plan/treatment(s) is/are 

subject to change based on patient's health status, clinical progression and re-

assessment by fellow clinical providers in the ED.  Further treatment and workup

at subsequent clinical providers discretion.  Patient/guardians urged not to 

elope from the ED as their condition may be serious if not clinically assessed 

and managed.  Initial orders include:


1- Patient sent to [facility] for further evaluation and treatment


2- xrays